# Patient Record
Sex: MALE | Race: AMERICAN INDIAN OR ALASKA NATIVE
[De-identification: names, ages, dates, MRNs, and addresses within clinical notes are randomized per-mention and may not be internally consistent; named-entity substitution may affect disease eponyms.]

---

## 2018-12-17 ENCOUNTER — HOSPITAL ENCOUNTER (EMERGENCY)
Dept: HOSPITAL 42 - ED | Age: 18
LOS: 1 days | Discharge: HOME | End: 2018-12-18
Payer: MEDICAID

## 2018-12-17 VITALS — RESPIRATION RATE: 18 BRPM | TEMPERATURE: 98 F

## 2018-12-17 VITALS — BODY MASS INDEX: 26.7 KG/M2

## 2018-12-17 DIAGNOSIS — N23: Primary | ICD-10-CM

## 2018-12-17 LAB
ALBUMIN SERPL-MCNC: 4.9 G/DL (ref 3–4.8)
ALBUMIN/GLOB SERPL: 1.5 {RATIO} (ref 1.1–1.8)
ALT SERPL-CCNC: 52 U/L (ref 7–56)
APPEARANCE UR: (no result)
AST SERPL-CCNC: 45 U/L (ref 17–59)
BASOPHILS # BLD AUTO: 0.03 K/MM3 (ref 0–2)
BASOPHILS NFR BLD: 0.2 % (ref 0–3)
BILIRUB UR-MCNC: NEGATIVE MG/DL
BUN SERPL-MCNC: 12 MG/DL (ref 7–21)
CALCIUM SERPL-MCNC: 10 MG/DL (ref 8.4–10.5)
COLOR UR: (no result)
EOSINOPHIL # BLD: 0 10*3/UL (ref 0–0.7)
EOSINOPHIL NFR BLD: 0 % (ref 1.5–5)
ERYTHROCYTE [DISTWIDTH] IN BLOOD BY AUTOMATED COUNT: 13.9 % (ref 11.5–14.5)
GFR NON-AFRICAN AMERICAN: > 60
GLUCOSE UR STRIP-MCNC: NEGATIVE MG/DL
GRANULOCYTES # BLD: 11.75 10*3/UL (ref 1.4–6.5)
GRANULOCYTES NFR BLD: 86.5 % (ref 50–68)
HGB BLD-MCNC: 14.1 G/DL (ref 14–18)
LEUKOCYTE ESTERASE UR-ACNC: (no result) LEU/UL
LIPASE SERPL-CCNC: 61 U/L (ref 23–300)
LYMPHOCYTES # BLD: 0.8 10*3/UL (ref 1.2–3.4)
LYMPHOCYTES NFR BLD AUTO: 5.9 % (ref 22–35)
MCH RBC QN AUTO: 28.1 PG (ref 25–35)
MCHC RBC AUTO-ENTMCNC: 35.4 G/DL (ref 31–37)
MCV RBC AUTO: 79.3 FL (ref 80–105)
MONOCYTES # BLD AUTO: 1 10*3/UL (ref 0.1–0.6)
MONOCYTES NFR BLD: 7.4 % (ref 1–6)
PH UR STRIP: 8 [PH] (ref 4.7–8)
PLATELET # BLD: 182 10^3/UL (ref 120–450)
PMV BLD AUTO: 12.2 FL (ref 7–11)
PROT UR STRIP-MCNC: 30 MG/DL
RBC # BLD AUTO: 5.02 10^6/UL (ref 3.5–6.1)
RBC # UR STRIP: (no result) /UL
RBC #/AREA URNS HPF: (no result) /HPF (ref 0–2)
SP GR UR STRIP: 1.02 (ref 1–1.03)
UROBILINOGEN UR STRIP-ACNC: 0.2 E.U./DL
WBC # BLD AUTO: 13.6 10^3/UL (ref 4.5–11)

## 2018-12-17 PROCEDURE — 96361 HYDRATE IV INFUSION ADD-ON: CPT

## 2018-12-17 PROCEDURE — 81001 URINALYSIS AUTO W/SCOPE: CPT

## 2018-12-17 PROCEDURE — 96374 THER/PROPH/DIAG INJ IV PUSH: CPT

## 2018-12-17 PROCEDURE — 74176 CT ABD & PELVIS W/O CONTRAST: CPT

## 2018-12-17 PROCEDURE — 87086 URINE CULTURE/COLONY COUNT: CPT

## 2018-12-17 PROCEDURE — 85025 COMPLETE CBC W/AUTO DIFF WBC: CPT

## 2018-12-17 PROCEDURE — 83690 ASSAY OF LIPASE: CPT

## 2018-12-17 PROCEDURE — 99283 EMERGENCY DEPT VISIT LOW MDM: CPT

## 2018-12-17 PROCEDURE — 80053 COMPREHEN METABOLIC PANEL: CPT

## 2018-12-17 NOTE — ED PDOC
Arrival/HPI





- General


Historian: Patient





- History of Present Illness


Narrative History of Present Illness (Text): 





12/17/18 23:40


18 year old male, whose past medical history includes asthma, presents to the 

emergency department with sudden onset flank and back pain.  Patient states pain

is in the mid to left back and the left abdomen and flank.  Patient informs 

having associated difficulty urinating since onset.  Patient denies any trauma 

or injury.  Patient denies any history of kidney stones. Patient also denies any

nausea, vomitng, diarrhea, dysuria, fever, or any other complaints.  





No PMD


Time/Duration: Prior to Arrival


Symptom Onset: Sudden


Symptom Course: Unchanged


Context: Home





<Carol Lane PA-C - Last Filed: 12/18/18 01:54>





<Tone Helms - Last Filed: 12/18/18 01:58>





- General


Chief Complaint: Back Pain


Time Seen by Provider: 12/17/18 20:41





Past Medical History





- Provider Review


Nursing Documentation Reviewed: Yes





- Pulmonary


Hx Asthma: Yes





- Psychiatric


Hx Substance Use: No





- Anesthesia


Hx Anesthesia: Yes





<Carol Lane PA-C - Last Filed: 12/18/18 01:54>





Family/Social History





- Physician Review


Nursing Documentation Reviewed: Yes


Family/Social History: No Known Family HX


Smoking Status: Never Smoked


Hx Alcohol Use: No


Hx Substance Use: No





<Carol Lane PA-C - Last Filed: 12/18/18 01:54>





Allergies/Home Meds





<Carol Lane PA-C - Last Filed: 12/18/18 01:54>





<Tone Helms - Last Filed: 12/18/18 01:58>


Allergies/Adverse Reactions: 


Allergies





No Known Allergies Allergy (Verified 12/17/18 20:33)


   











Review of Systems





- Physician Review


All systems were reviewed & negative as marked: Yes





- Review of Systems


Constitutional: absent: Fevers


Gastrointestinal: Abdominal Pain (Left flank).  absent: Diarrhea, Nausea, 

Vomiting


Genitourinary Male: Other (Difficulty urinating).  absent: Dysuria





<Carol Lane PA-C - Last Filed: 12/18/18 01:54>





Physical Exam


Vital Signs Reviewed: Yes





Vital Signs











  Temp Pulse Resp BP Pulse Ox


 


 12/17/18 23:27  98.0 F  71  18  127/71  98


 


 12/17/18 20:37  98.0 F  68  18  164/97 H  98











Temperature: Afebrile


Blood Pressure: Normal


Pulse: Regular


Respiratory Rate: Normal


Appearance: Positive for: Well-Appearing, Non-Toxic, Comfortable


Pain Distress: Mild


Mental Status: Positive for: Alert and Oriented X 3





- Systems Exam


Head: Present: Atraumatic, Normocephalic


Pupils: Present: PERRL


Extroacular Muscles: Present: EOMI


Conjunctiva: Present: Normal


Mouth: Present: Moist Mucous Membranes


Neck: Present: Normal Range of Motion


Respiratory/Chest: Present: Clear to Auscultation, Good Air Exchange.  No: 

Respiratory Distress, Accessory Muscle Use


Cardiovascular: Present: Regular Rate and Rhythm, Normal S1, S2.  No: Murmurs


Abdomen: No: Tenderness, Distention, Peritoneal Signs


Back: Present: Normal Inspection.  No: CVA Tenderness, Midline Tenderness, 

Paraspinal Tenderness


Upper Extremity: Present: Normal Inspection.  No: Cyanosis, Edema


Lower Extremity: Present: Normal Inspection.  No: Edema


Neurological: Present: GCS=15, CN II-XII Intact, Speech Normal


Skin: Present: Warm, Dry, Normal Color.  No: Rashes


Psychiatric: Present: Alert, Oriented x 3, Normal Insight, Normal Concentration





<Carol Lane PA-C - Last Filed: 12/18/18 01:54>





Vital Signs











  Temp Pulse Resp BP Pulse Ox


 


 12/17/18 23:27  98.0 F  71  18  127/71  98


 


 12/17/18 20:37  98.0 F  68  18  164/97 H  98














<Tone Helms - Last Filed: 12/18/18 01:58>





Medical Decision Making


ED Course and Treatment: 





12/17/18 23:48


Impression: 18 year old male presents with left flank and left back pain





Plan:


-- CT ABD & Pelvis


-- Toradol


-- Urine culture


-- Labs


-- Reassess and disposition





Prior Visits:


Notes and results from previous visits were reviewed. 





Progress Notes:


Labs reviewed : wbc 13.6, cmp wnl, ua noted to have blood. 





12/18/18 00:36


CT SCAN OF THE ABDOMEN AND PELVIS WITHOUT ORAL OR IV CONTRAST. 


CLINICAL INDICATION:  Flank pain.


TECHNIQUE: Axial and reformatted sagittal and coronal images of the abdomen 

pelvis obtained without IV contrast administration. 


COMPARISON: None.


FINDINGS: The visualized lung bases are unremarkable.          


Mildly enlarged fatty unenhanced liver.  Normal gallbladder and extrahepatic 

biliary system.  Normal unenhanced spleen.  Normal pancreas.


Normal bilateral adrenal glands.


Normal size of the right kidney.  There is no right renal mass.  There are no 

right renal calculi.  3.7 mm obstructing stone of the right ureterovesical 

junction. 


Mild fullness of the right collecting system. 


Normal size of the left kidney.  There is no left renal mass.  There are no left

renal calculi.  There is no left hydronephrosis.  Normal visualized left ureter.


Normal visualized stomach.  Normal small intestine.  Normal colon.  The appendix

is visualized and appears normal.          


There is no demonstrated peritoneal fluid.


Normal abdominal aorta.  Normal inferior vena cava.  Normal retroperitoneum.


Normal urinary bladder.  There is no pelvic mass lesion or lymphadenopathy.  

There is no pelvic fluid.     


Normal abdominal wall.  Normal osseous structures.


IMPRESSION:


Mildly obstructing stone of the right ureterovesical junction.


12/18/18 01:54








On reevaluation, patient reports improvement of symptoms, denies any nausea, 

vomiting, abdominal pain or back pain. On exam, patient remains awake alert and 

oriented 3 in no acute distress. Abdomen soft and nontender, with no CVA 

tenderness.


Diagnostic results discussed with the patient in great detail. Diagnosis of 

renal colic d/w the patient, medicated with flomax. 





Advised to follow up with urology referral in 1-2 days without fail. Advised to 

take medication as prescribed, drink plenty of water and to strain his urine. 

Return to the emergency room at any time for any new or worsening symptoms.





Patient states he fully agrees with and understands discharge instructions. 

States that he agrees with the plan and disposition. Verbalized and repeated 

discharge instructions and plan. I have given the patient opportunity to ask any

additional questions.





- Lab Interpretations


Lab Results: 











                                 12/17/18 22:05 





                                 12/17/18 22:05 





                                   Lab Results





12/17/18 22:05: Sodium 141, Potassium 4.1, Chloride 102, Carbon Dioxide 29, 

Anion Gap 14, BUN 12, Creatinine 1.0, Est GFR (African Amer) > 60, Est GFR (Non-

Af Amer) > 60, Random Glucose 109, Calcium 10.0, Total Bilirubin 0.8, AST 45, 

ALT 52, Alkaline Phosphatase 56, Total Protein 8.2, Albumin 4.9 H, Globulin 3.3,

Albumin/Globulin Ratio 1.5, Lipase 61


12/17/18 22:05: WBC 13.6 H, RBC 5.02, Hgb 14.1, Hct 39.8 L, MCV 79.3 L, MCH 

28.1, MCHC 35.4, RDW 13.9, Plt Count 182, MPV 12.2 H, Gran % 86.5 H, Lymph % 

(Auto) 5.9 L, Mono % (Auto) 7.4 H, Eos % (Auto) 0.0 L, Baso % (Auto) 0.2, Gran #

11.75 H, Lymph # (Auto) 0.8 L, Mono # (Auto) 1.0 H, Eos # (Auto) 0.0, Baso # 

(Auto) 0.03


12/17/18 21:22: Urine Color Red, Urine Appearance Slight-cloudy, Urine pH 8.0, 

Ur Specific Gravity 1.020, Urine Protein 30 H, Urine Glucose (UA) Negative, 

Urine Ketones Negative, Urine Blood Large H, Urine Nitrate Negative, Urine 

Bilirubin Negative, Urine Urobilinogen 0.2, Ur Leukocyte Esterase Trace H, Urine

RBC Tntc, Urine WBC 2 - 5, Ur Epithelial Cells 4 - 5











- RAD Interpretation


Radiology Orders: 











12/17/18 21:22


ABD & PELVIS W/O PO OR IV CONT [CT] Stat 














- Medication Orders


Current Medication Orders: 














Discontinued Medications





Sodium Chloride (Sodium Chloride 0.9%)  1,000 mls @ 1,000 mls/hr IV .Q1H STA


   Stop: 12/17/18 22:26


   Last Admin: 12/17/18 21:52  Dose: 1,000 mls/hr





eMAR Start Stop


 Document     12/17/18 21:52  OCS  (Rec: 12/17/18 21:52  OCS  [id number])


     Intravenous Solution


      Start Date                                 12/17/18


      Start Time                                 21:52


      End Date                                   12/17/18


      End time                                   22:52


      Total Infusion Time                        60





Ketorolac Tromethamine (Toradol)  30 mg IVP STAT STA


   Stop: 12/17/18 21:28


   Last Admin: 12/17/18 21:52  Dose: 30 mg





MAR Pain Assessment


 Document     12/17/18 21:52  OCS  (Rec: 12/17/18 21:52  OCS  [facility])


     Pain Reassessment


      Is this a pain reassessment?               No


     Sleep


      Is patient sleeping during reassessment?   No


     Presence of Pain


      Presence of Pain                           Yes


     Pain Scale Used


     Protocol:  PSCALES


      Pain Scale Used                            Numeric


     Location


      Pain Location Body Site                    Back


     Description


      Description                                Constant


      Intensity of Pain at present               8


      Pain Behavior                              Facial Grimacing


      Aggravating Factors                        ADL's


IVP Administration


 Document     12/17/18 21:52  OCS  (Rec: 12/17/18 21:52  OCS  [facility])


     Charges for Administration


      # of IVP Administrations                   1














<Carol Lane PA-C - Last Filed: 12/18/18 01:54>





- Lab Interpretations


Lab Results: 











                                 12/17/18 22:05 





                                 12/17/18 22:05 





                                   Lab Results





12/17/18 22:05: Sodium 141, Potassium 4.1, Chloride 102, Carbon Dioxide 29, 

Anion Gap 14, BUN 12, Creatinine 1.0, Est GFR (African Amer) > 60, Est GFR (Non-

Af Amer) > 60, Random Glucose 109, Calcium 10.0, Total Bilirubin 0.8, AST 45, 

ALT 52, Alkaline Phosphatase 56, Total Protein 8.2, Albumin 4.9 H, Globulin 3.3,

 Albumin/Globulin Ratio 1.5, Lipase 61


12/17/18 22:05: WBC 13.6 H, RBC 5.02, Hgb 14.1, Hct 39.8 L, MCV 79.3 L, MCH 

28.1, MCHC 35.4, RDW 13.9, Plt Count 182, MPV 12.2 H, Gran % 86.5 H, Lymph % 

(Auto) 5.9 L, Mono % (Auto) 7.4 H, Eos % (Auto) 0.0 L, Baso % (Auto) 0.2, Gran #

 11.75 H, Lymph # (Auto) 0.8 L, Mono # (Auto) 1.0 H, Eos # (Auto) 0.0, Baso # 

(Auto) 0.03


12/17/18 21:22: Urine Color Red, Urine Appearance Slight-cloudy, Urine pH 8.0, 

Ur Specific Gravity 1.020, Urine Protein 30 H, Urine Glucose (UA) Negative, 

Urine Ketones Negative, Urine Blood Large H, Urine Nitrate Negative, Urine 

Bilirubin Negative, Urine Urobilinogen 0.2, Ur Leukocyte Esterase Trace H, Urine

 RBC Tntc, Urine WBC 2 - 5, Ur Epithelial Cells 4 - 5











- RAD Interpretation


Radiology Orders: 











12/17/18 21:22


ABD & PELVIS W/O PO OR IV CONT [CT] Stat 














- Medication Orders


Current Medication Orders: 














Discontinued Medications





Sodium Chloride (Sodium Chloride 0.9%)  1,000 mls @ 1,000 mls/hr IV .Q1H STA


   Stop: 12/17/18 22:26


   Last Admin: 12/17/18 21:52  Dose: 1,000 mls/hr





eMAR Start Stop


 Document     12/17/18 21:52  OCS  (Rec: 12/17/18 21:52  OCS  [facility])


     Intravenous Solution


      Start Date                                 12/17/18


      Start Time                                 21:52


      End Date                                   12/17/18


      End time                                   22:52


      Total Infusion Time                        60





Ketorolac Tromethamine (Toradol)  30 mg IVP STAT STA


   Stop: 12/17/18 21:28


   Last Admin: 12/17/18 21:52  Dose: 30 mg





MAR Pain Assessment


 Document     12/17/18 21:52  OCS  (Rec: 12/17/18 21:52  OCS  [facility])


     Pain Reassessment


      Is this a pain reassessment?               No


     Sleep


      Is patient sleeping during reassessment?   No


     Presence of Pain


      Presence of Pain                           Yes


     Pain Scale Used


     Protocol:  PSCALES


      Pain Scale Used                            Numeric


     Location


      Pain Location Body Site                    Back


     Description


      Description                                Constant


      Intensity of Pain at present               8


      Pain Behavior                              Facial Grimacing


      Aggravating Factors                        ADL's


IVP Administration


 Document     12/17/18 21:52  OCS  (Rec: 12/17/18 21:52  OCS  [facility])


     Charges for Administration


      # of IVP Administrations                   1





Tamsulosin HCl (Flomax)  0.4 mg PO STAT STA


   Stop: 12/18/18 00:57


   Last Admin: 12/18/18 01:34  Dose: 0.4 mg











<Tone eHlms - Last Filed: 12/18/18 01:58>





- PA / NP / Resident Statement


MD/ has reviewed & agrees with the documentation as recorded.





- Scribe Statement


The provider has reviewed the documentation as recorded by the Curry Mata





Provider Scribe Attestation:


All medical record entries made by the Scribmaryuri were at my direction and 

personally dictated by me. I have reviewed the chart and agree that the record 

accurately reflects my personal performance of the history, physical exam, 

medical decision making, and the department course for this patient. I have also

 personally directed, reviewed, and agree with the discharge instructions and 

disposition.











<Carol Lane PA-C - Last Filed: 12/18/18 01:54>





- PA / NP / Resident Statement


MD/ has reviewed & agrees with the documentation as recorded.





<Tone Helms - Last Filed: 12/18/18 01:58>





Disposition/Present on Arrival





- Present on Arrival


Any Indicators Present on Arrival: No


History of DVT/PE: No


History of Uncontrolled Diabetes: No


Urinary Catheter: No


History of Decub. Ulcer: No


History Surgical Site Infection Following: None





- Disposition


Have Diagnosis and Disposition been Completed?: Yes


Disposition Time: 00:45


Patient Plan: Discharge





<Carol Lane PA-C - Last Filed: 12/18/18 01:54>





<Tone Helms - Last Filed: 12/18/18 01:58>





- Disposition


Diagnosis: 


 Renal colic





Disposition: HOME/ ROUTINE


Patient Problems: 


                             Current Active Problems











Problem Status Onset


 


Renal colic Acute 











Condition: STABLE


Discharge Instructions (ExitCare):  Renal Colic


Additional Instructions: 


Thank you for letting us take care of you today. You were treated for renal 

colic. The emergency medical care you received today was directed at your acute 

symptoms. If you were prescribed any medication, please fill it and take as 

directed. It may take several days for your symptoms to resolve. Return to the 

Emergency Department if your symptoms worsen, do not improve, or if you have any

 other problems.





Please contact your doctor in 2 days for re-evaluation and follow up / or call 

one of the physicians/clinics you have been referred to that are listed on the 

Patient Visit Information form that is included in your discharge packet. Bring 

any paperwork you were given at discharge with you along with any medications 

you are taking to your follow up visit. Our treatment cannot replace ongoing 

medical care by a primary care provider (PCP) outside of the emergency 

department.





Thank you for allowing the [facility] team to be part of your care today.








If you had a CT scan: A Radiologist will review the ED reading if any change in 

treatment is needed we will contact you.***





If you had a urine culture: It will take several days for the results, if any 

change in treatment is needed we will contact you.***





Prescriptions: 


Naproxen 500 mg PO BID PRN #20 tablet


 PRN Reason: Pain, Moderate (4-7)


Tamsulosin [Flomax] 0.4 mg PO DAILY #10 cap


Referrals: 


PCP,NO [Primary Care Provider] - Follow up with primary


Inna August MD [Staff Provider] - Follow up with primary


Forms:  Xention (English)

## 2018-12-18 VITALS — HEART RATE: 75 BPM | DIASTOLIC BLOOD PRESSURE: 72 MMHG | SYSTOLIC BLOOD PRESSURE: 130 MMHG | OXYGEN SATURATION: 99 %

## 2018-12-18 NOTE — CT
Date of service: 



12/17/2018



PROCEDURE:  CT Abdomen and Pelvis without intravenous contrast



HISTORY:

flank pain, r/o stone



COMPARISON:

None.



TECHNIQUE:

Without contrast. 



Contrast dose: 



Radiation dose:



Total exam DLP = 1127.67 mGy-cm.



This CT exam was performed using one or more of the following dose 

reduction techniques: Automated exposure control, adjustment of the 

mA and/or kV according to patient size, and/or use of iterative 

reconstruction technique.



FINDINGS:



LOWER THORAX:

Unremarkable. 



LIVER:

Unremarkable. No gross lesion or ductal dilatation.  



GALLBLADDER AND BILE DUCTS:

Unremarkable. 



PANCREAS:

Unremarkable. No gross lesion or ductal dilatation.



SPLEEN:

Unremarkable. 



ADRENALS:

Unremarkable. No mass. 



KIDNEYS AND URETERS:

There is a small 1 x 3 mm stone in the right UVJ.  There is minimal 

right-sided hydronephrosis and hydroureter.



Finding is best seen on axial image 162 series 3 and coronal image 61 



VASCULATURE:

Unremarkable. No aortic aneurysm. No aortic atherosclerotic 

calcification or mural plaque present.



BOWEL:

Unremarkable. No obstruction. No gross mural thickening. 



APPENDIX:

Unremarkable. Normal appendix. 



PERITONEUM:

Unremarkable. No free fluid. No free air. 



LYMPH NODES:

Unremarkable. No enlarged lymph nodes. 



BLADDER:

Unremarkable. 



REPRODUCTIVE:

Unremarkable. 



BONES:

No acute fracture. 



OTHER FINDINGS:

The report concurs with the preliminary [employer] report



IMPRESSION:

There is a small 1 x 3 mm stone in the right UVJ.  There is minimal 

right-sided hydronephrosis and hydroureter.